# Patient Record
Sex: MALE | NOT HISPANIC OR LATINO | URBAN - METROPOLITAN AREA
[De-identification: names, ages, dates, MRNs, and addresses within clinical notes are randomized per-mention and may not be internally consistent; named-entity substitution may affect disease eponyms.]

---

## 2021-06-24 ENCOUNTER — OFFICE (OUTPATIENT)
Dept: URBAN - METROPOLITAN AREA PATHOLOGY 4 | Facility: PATHOLOGY | Age: 56
End: 2021-06-24
Payer: COMMERCIAL

## 2021-06-24 ENCOUNTER — AMBULATORY SURGICAL CENTER (OUTPATIENT)
Dept: URBAN - METROPOLITAN AREA SURGERY 20 | Facility: SURGERY | Age: 56
End: 2021-06-24
Payer: COMMERCIAL

## 2021-06-24 DIAGNOSIS — Z12.11 ENCOUNTER FOR SCREENING FOR MALIGNANT NEOPLASM OF COLON: ICD-10-CM

## 2021-06-24 DIAGNOSIS — D12.3 BENIGN NEOPLASM OF TRANSVERSE COLON: ICD-10-CM

## 2021-06-24 DIAGNOSIS — Z80.0 FAMILY HISTORY OF MALIGNANT NEOPLASM OF DIGESTIVE ORGANS: ICD-10-CM

## 2021-06-24 PROBLEM — K63.5 POLYP OF COLON: Status: ACTIVE | Noted: 2021-06-24

## 2021-06-24 PROBLEM — Z80.9 FAMILY HISTORY OF COLON CANCER: Status: ACTIVE | Noted: 2021-06-24

## 2021-06-24 LAB
GI HISTOLOGY: A. SELECT: (no result)
GI HISTOLOGY: PDF REPORT: (no result)

## 2021-06-24 PROCEDURE — 45380 COLONOSCOPY AND BIOPSY: CPT | Mod: 33 | Performed by: INTERNAL MEDICINE

## 2021-06-24 PROCEDURE — 88305 TISSUE EXAM BY PATHOLOGIST: CPT | Mod: 33 | Performed by: INTERNAL MEDICINE

## 2024-03-25 ENCOUNTER — OFFICE VISIT (OUTPATIENT)
Dept: INTERNAL MEDICINE | Facility: CLINIC | Age: 59
End: 2024-03-25
Payer: COMMERCIAL

## 2024-03-25 ENCOUNTER — PATIENT ROUNDING (BHMG ONLY) (OUTPATIENT)
Dept: INTERNAL MEDICINE | Facility: CLINIC | Age: 59
End: 2024-03-25
Payer: COMMERCIAL

## 2024-03-25 VITALS
RESPIRATION RATE: 16 BRPM | HEART RATE: 56 BPM | BODY MASS INDEX: 28.33 KG/M2 | WEIGHT: 232.6 LBS | TEMPERATURE: 98 F | HEIGHT: 76 IN | SYSTOLIC BLOOD PRESSURE: 122 MMHG | OXYGEN SATURATION: 97 % | DIASTOLIC BLOOD PRESSURE: 82 MMHG

## 2024-03-25 DIAGNOSIS — Z23 NEED FOR COVID-19 VACCINE: ICD-10-CM

## 2024-03-25 DIAGNOSIS — Z11.59 ENCOUNTER FOR HEPATITIS C SCREENING TEST FOR LOW RISK PATIENT: ICD-10-CM

## 2024-03-25 DIAGNOSIS — Z76.89 ENCOUNTER TO ESTABLISH CARE: Primary | ICD-10-CM

## 2024-03-25 DIAGNOSIS — B35.1 TOENAIL FUNGUS: ICD-10-CM

## 2024-03-25 NOTE — PROGRESS NOTES
"Chief Complaint  Establish Care (Pt wanting to establish care, does get annual physicals through his occupation, but wanted an in depth discussion for some things that they ([employer]) doesn't address. They have advised bc of his age he needs a prostate exam, EKG possibly. Pt not having any issues, but has been told that he needs this. Pt states that he has never officially had a PCP)    Subjective        Terry Carpenter presents to [facility]  History of Present Illness  Here to establish primary care.     Last annual physial exam is from 07/07/2023. He is an  and has an annual exam with labs through his employer, but not considered primary care.       Colonoscopy: he gets every 5 years, due to family hx of both parents having cancer. He has hx of polyps. Last colonoscopy was in 2020 and will be due in 2025. He does not know where or with whom he gets these done.     Toenail fungus: this is a chronic, intermittent issue. He uses topical applications, and takes essential oils. Gets some relief. He has taken oral antifungal medication in the past, but no real relief. He is not very bothered by it. And not he his not interested in seeing a podiatrist at this time. He is trying to grow out the diseased nail trimming as needed. No foot or toe pain, no decrease in range of motion.       Objective   Vital Signs:  /82 (BP Location: Right arm, Patient Position: Sitting, Cuff Size: Adult)   Pulse 56   Temp 98 °F (36.7 °C) (Infrared)   Resp 16   Ht 193 cm (76\")   Wt 106 kg (232 lb 9.6 oz)   SpO2 97%   BMI 28.31 kg/m²   Estimated body mass index is 28.31 kg/m² as calculated from the following:    Height as of this encounter: 193 cm (76\").    Weight as of this encounter: 106 kg (232 lb 9.6 oz).             Physical Exam  Vitals reviewed.   Constitutional:       Appearance: Normal appearance.      Comments: overweight   Cardiovascular:      Rate and Rhythm: Normal " rate and regular rhythm.      Pulses: Normal pulses.      Heart sounds: Normal heart sounds.   Pulmonary:      Effort: Pulmonary effort is normal.      Breath sounds: Normal breath sounds.   Feet:      Right foot:      Skin integrity: Skin integrity normal.      Toenail Condition: Fungal disease present.     Left foot:      Skin integrity: Skin integrity normal.      Toenail Condition: Fungal disease present.     Comments: Right great toe is most prominent with fungal infection when compared with left great toe that only shows a minimal amount of fungal infection.   Neurological:      General: No focal deficit present.      Mental Status: He is alert and oriented to person, place, and time. Mental status is at baseline.   Psychiatric:         Mood and Affect: Mood normal.         Behavior: Behavior normal.         Thought Content: Thought content normal.         Judgment: Judgment normal.        Result Review :                   Assessment and Plan   Patient is a very pleasant 58-year-old male with personal history of colon polyps, family history colon cancer, bilateral chronic, intermittent toenail fungal infection here to establish primary care.              Diagnoses and all orders for this visit:    1. Encounter to establish care (Primary)  Comments:  Annual physical done through employer along with routine labs.    2. Need for COVID-19 vaccine  -     COVID-19 F23 (Pfizer) 12yrs+ (COMIRNATY)    3. Encounter for hepatitis C screening test for low risk patient  -     HCV Antibody Rfx To Qnt PCR    4. Toenail fungus  Comments:  Continue to use over-the-counter topical preparations. Reassess at next office visit. Consider referring to podiatrist if no improvement or worsens.             Follow Up   Return if symptoms worsen or fail to improve.  Patient was given instructions and counseling regarding his condition or for health maintenance advice. Please see specific information pulled into the AVS if appropriate.

## 2024-03-26 LAB
HCV AB SERPL QL IA: NORMAL
HCV IGG SERPL QL IA: NON REACTIVE